# Patient Record
Sex: FEMALE | Race: WHITE
[De-identification: names, ages, dates, MRNs, and addresses within clinical notes are randomized per-mention and may not be internally consistent; named-entity substitution may affect disease eponyms.]

---

## 2019-10-16 ENCOUNTER — HOSPITAL ENCOUNTER (OUTPATIENT)
Dept: HOSPITAL 61 - MAMMO | Age: 69
Discharge: HOME | End: 2019-10-16
Attending: FAMILY MEDICINE
Payer: COMMERCIAL

## 2019-10-16 DIAGNOSIS — N63.21: ICD-10-CM

## 2019-10-16 DIAGNOSIS — Z12.31: Primary | ICD-10-CM

## 2019-10-16 DIAGNOSIS — N63.10: ICD-10-CM

## 2019-10-16 PROCEDURE — 77063 BREAST TOMOSYNTHESIS BI: CPT

## 2019-10-16 PROCEDURE — 77067 SCR MAMMO BI INCL CAD: CPT

## 2019-10-16 NOTE — RAD
DATE: 10/16/2019



EXAM: MAMMO ISAAC SCREENING BILATERAL



HISTORY: Routine screening. Left breast biopsy in 2004.



COMPARISON: 7/10/2015 mammogram



This study was interpreted with the benefit of Computerized Aided Detection

(CAD).





Breast Density: HETERO The breast parenchyma is heterogenously dense, which

could reduce sensitivity of mammography. Breast parenchyma level C.





FINDINGS: Small masses have mostly remained stable.  

Other small new well defined masses are present in the interval but without

aggressive features. Centimeters these are present bilaterally at the lower

axillary level and likely represent lymph nodes. There is a left central 12:00

retroareolar mass which is new since the prior exam likely representing a cyst

considering its low-density. It measures 1.1 cm in diameter and is present 5.4

cm from the nipple. No suspicious calcification or distortion.



IMPRESSION: Indeterminate left central 12:00 breast mass.







BI-RADS CATEGORY: 0 INCOMPLETE: NEEDS ADDITIONAL IMAGING EVALUATION AND/OR

PRIOR MAMMOGRAMS FOR COMPARISON.



RECOMMENDED FOLLOW-UP: ADD ADDITIONAL IMAGING. Ultrasound is recommended for

the left central 12:00 retroareolar mass lesion which is new in the interval.



PQRS compliance statement: Patient information was entered into a reminder

system with a target due date for the next mammogram.



Mammography is a sensitive method for finding small breast cancers, but it

does not detect them all and is not a substitute for careful clinical

examination.  A negative mammogram does not negate a clinically suspicious

finding and should not result in delay in biopsying a clinically suspicious

abnormality.



"Our facility is accredited by the American [employer] Mammography

Program."

## 2019-10-18 ENCOUNTER — HOSPITAL ENCOUNTER (OUTPATIENT)
Dept: HOSPITAL 61 - US | Age: 69
Discharge: HOME | End: 2019-10-18
Attending: FAMILY MEDICINE
Payer: COMMERCIAL

## 2019-10-18 DIAGNOSIS — N63.0: ICD-10-CM

## 2019-10-18 DIAGNOSIS — N60.02: Primary | ICD-10-CM

## 2019-10-18 PROCEDURE — 76641 ULTRASOUND BREAST COMPLETE: CPT

## 2019-10-18 NOTE — RAD
Examination: BREAST LEFT

 

History: Abnormal mammogram

 

Comparison/Correlation: 10/16/2019 screen mammographic exam

 

Findings: At the left breast 12:00 region 2 cm from the nipple, there is a

1.1 cm x 0.7 cm x 0.7 cm tall anechoic structure corresponding to finding 

on mammography. No flow evident involving it.

 

 

Impression:

BI-RADS Category 2-benign. A cyst is present corresponding to the finding 

on mammography. Annual mammographic screening recommended.

 

Electronically signed by: Gustavo Ramirez MD (10/18/2019 12:24 PM) [facility]

## 2020-08-04 ENCOUNTER — HOSPITAL ENCOUNTER (OUTPATIENT)
Dept: HOSPITAL 61 - MAMMO | Age: 70
Discharge: HOME | End: 2020-08-04
Attending: FAMILY MEDICINE
Payer: MEDICARE

## 2020-08-04 DIAGNOSIS — R92.2: Primary | ICD-10-CM

## 2020-08-04 PROCEDURE — 77066 DX MAMMO INCL CAD BI: CPT

## 2020-08-04 NOTE — RAD
DATE: 8/4/2020 10:00 AM



EXAM: MAMMO ISAAC SILAS BILAT



HISTORY:  Diffuse left breast pain. Requested preclinical imaging evaluation

prior to breast specialist consultation.



COMPARISON: Bilateral mammograms of 11/27/2007, 11/5/2009, 7/10/2015 and

10/16/2019



Bilateral CC and MLO views of the breasts were performed. Bilateral breast

tomosynthesis was performed in CC and MLO projections.



This study was interpreted with the benefit of Computerized Aided Detection

(CAD).





FINDINGS:



Breast Density: HETERO  The breast parenchyma Is heterogeneously dense, which

could reduce sensitivity of mammography. Breast parenchyma level C





No suspicious masses, microcalcifications or architectural distortion is

present to suggest malignancy in either breast.





The visualized axillae are unremarkable. 



IMPRESSION: No mammographic evidence of malignancy. 



BI-RADS CATEGORY: 1 NEGATIVE



RECOMMENDED FOLLOW-UP: 12M 12 MONTH FOLLOW-UP Annual screening mammography is

recommended, unless clinically indicated sooner based on symptoms or change in

physical exam.



PQRS compliance statement: Patient information was entered into a reminder

system with a target due date for the next mammogram.



Mammography is a sensitive method for finding small breast cancers, but it

does not detect them all and is not a substitute for careful clinical

examination.  A negative mammogram does not negate a clinically suspicious

finding and should not result in delay in biopsying a clinically suspicious

abnormality.



"Our facility is accredited by the American [employer] of [employer] Mammography

Program."

## 2021-07-14 ENCOUNTER — HOSPITAL ENCOUNTER (EMERGENCY)
Dept: HOSPITAL 61 - ER | Age: 71
Discharge: HOME | End: 2021-07-14
Payer: MEDICARE

## 2021-07-14 VITALS
DIASTOLIC BLOOD PRESSURE: 66 MMHG | SYSTOLIC BLOOD PRESSURE: 148 MMHG | SYSTOLIC BLOOD PRESSURE: 148 MMHG | DIASTOLIC BLOOD PRESSURE: 66 MMHG

## 2021-07-14 VITALS — WEIGHT: 169.32 LBS | BODY MASS INDEX: 28.21 KG/M2 | HEIGHT: 65 IN

## 2021-07-14 DIAGNOSIS — R10.13: ICD-10-CM

## 2021-07-14 DIAGNOSIS — R50.9: ICD-10-CM

## 2021-07-14 DIAGNOSIS — Z98.51: ICD-10-CM

## 2021-07-14 DIAGNOSIS — U07.1: Primary | ICD-10-CM

## 2021-07-14 DIAGNOSIS — R53.1: ICD-10-CM

## 2021-07-14 DIAGNOSIS — Z88.8: ICD-10-CM

## 2021-07-14 DIAGNOSIS — R05: ICD-10-CM

## 2021-07-14 DIAGNOSIS — Z90.49: ICD-10-CM

## 2021-07-14 LAB
ALBUMIN SERPL-MCNC: 3.4 G/DL (ref 3.4–5)
ALBUMIN/GLOB SERPL: 0.9 {RATIO} (ref 1–1.7)
ALP SERPL-CCNC: 57 U/L (ref 46–116)
ALT SERPL-CCNC: 32 U/L (ref 14–59)
ANION GAP SERPL CALC-SCNC: 12 MMOL/L (ref 6–14)
APTT PPP: YELLOW S
AST SERPL-CCNC: 34 U/L (ref 15–37)
BACTERIA #/AREA URNS HPF: 0 /HPF
BASOPHILS # BLD AUTO: 0 X10^3/UL (ref 0–0.2)
BASOPHILS NFR BLD: 0 % (ref 0–3)
BILIRUB SERPL-MCNC: 0.4 MG/DL (ref 0.2–1)
BILIRUB UR QL STRIP: NEGATIVE
BUN SERPL-MCNC: 14 MG/DL (ref 7–20)
BUN/CREAT SERPL: 20 (ref 6–20)
CALCIUM SERPL-MCNC: 8.4 MG/DL (ref 8.5–10.1)
CHLORIDE SERPL-SCNC: 100 MMOL/L (ref 98–107)
CO2 SERPL-SCNC: 24 MMOL/L (ref 21–32)
CREAT SERPL-MCNC: 0.7 MG/DL (ref 0.6–1)
EOSINOPHIL NFR BLD: 0 % (ref 0–3)
EOSINOPHIL NFR BLD: 0 X10^3/UL (ref 0–0.7)
ERYTHROCYTE [DISTWIDTH] IN BLOOD BY AUTOMATED COUNT: 13.3 % (ref 11.5–14.5)
FIBRINOGEN PPP-MCNC: CLEAR MG/DL
GFR SERPLBLD BASED ON 1.73 SQ M-ARVRAT: 82.7 ML/MIN
GLUCOSE SERPL-MCNC: 111 MG/DL (ref 70–99)
HCT VFR BLD CALC: 40.4 % (ref 36–47)
HGB BLD-MCNC: 13.9 G/DL (ref 12–15.5)
LIPASE: 196 U/L (ref 73–393)
LYMPHOCYTES # BLD: 1 X10^3/UL (ref 1–4.8)
LYMPHOCYTES NFR BLD AUTO: 29 % (ref 24–48)
MAGNESIUM SERPL-MCNC: 1.9 MG/DL (ref 1.8–2.4)
MCH RBC QN AUTO: 30 PG (ref 25–35)
MCHC RBC AUTO-ENTMCNC: 34 G/DL (ref 31–37)
MCV RBC AUTO: 89 FL (ref 79–100)
MONO #: 0.3 X10^3/UL (ref 0–1.1)
MONOCYTES NFR BLD: 9 % (ref 0–9)
NEUT #: 2.1 X10^3/UL (ref 1.8–7.7)
NEUTROPHILS NFR BLD AUTO: 62 % (ref 31–73)
NITRITE UR QL STRIP: NEGATIVE
PH UR STRIP: 6 [PH]
PLATELET # BLD AUTO: 167 X10^3/UL (ref 140–400)
POTASSIUM SERPL-SCNC: 4.1 MMOL/L (ref 3.5–5.1)
PROT SERPL-MCNC: 7 G/DL (ref 6.4–8.2)
PROT UR STRIP-MCNC: NEGATIVE MG/DL
RBC # BLD AUTO: 4.56 X10^6/UL (ref 3.5–5.4)
RBC #/AREA URNS HPF: 0 /HPF (ref 0–2)
SODIUM SERPL-SCNC: 136 MMOL/L (ref 136–145)
UROBILINOGEN UR-MCNC: 0.2 MG/DL
WBC # BLD AUTO: 3.5 X10^3/UL (ref 4–11)
WBC #/AREA URNS HPF: (no result) /HPF (ref 0–4)

## 2021-07-14 PROCEDURE — 96375 TX/PRO/DX INJ NEW DRUG ADDON: CPT

## 2021-07-14 PROCEDURE — 81001 URINALYSIS AUTO W/SCOPE: CPT

## 2021-07-14 PROCEDURE — 36415 COLL VENOUS BLD VENIPUNCTURE: CPT

## 2021-07-14 PROCEDURE — 96374 THER/PROPH/DIAG INJ IV PUSH: CPT

## 2021-07-14 PROCEDURE — U0003 INFECTIOUS AGENT DETECTION BY NUCLEIC ACID (DNA OR RNA); SEVERE ACUTE RESPIRATORY SYNDROME CORONAVIRUS 2 (SARS-COV-2) (CORONAVIRUS DISEASE [COVID-19]), AMPLIFIED PROBE TECHNIQUE, MAKING USE OF HIGH THROUGHPUT TECHNOLOGIES AS DESCRIBED BY CMS-2020-01-R: HCPCS

## 2021-07-14 PROCEDURE — 93005 ELECTROCARDIOGRAM TRACING: CPT

## 2021-07-14 PROCEDURE — 74022 RADEX COMPL AQT ABD SERIES: CPT

## 2021-07-14 PROCEDURE — 83690 ASSAY OF LIPASE: CPT

## 2021-07-14 PROCEDURE — 83735 ASSAY OF MAGNESIUM: CPT

## 2021-07-14 PROCEDURE — 80053 COMPREHEN METABOLIC PANEL: CPT

## 2021-07-14 PROCEDURE — 84484 ASSAY OF TROPONIN QUANT: CPT

## 2021-07-14 PROCEDURE — 94640 AIRWAY INHALATION TREATMENT: CPT

## 2021-07-14 PROCEDURE — 99285 EMERGENCY DEPT VISIT HI MDM: CPT

## 2021-07-14 PROCEDURE — 96361 HYDRATE IV INFUSION ADD-ON: CPT

## 2021-07-14 PROCEDURE — 85025 COMPLETE CBC W/AUTO DIFF WBC: CPT

## 2021-07-14 RX ADMIN — BACITRACIN SCH MLS/HR: 5000 INJECTION, POWDER, FOR SOLUTION INTRAMUSCULAR at 12:12

## 2021-07-14 RX ADMIN — METHYLPREDNISOLONE SODIUM SUCCINATE ONE MG: 125 INJECTION, POWDER, FOR SOLUTION INTRAMUSCULAR; INTRAVENOUS at 12:13

## 2021-07-14 RX ADMIN — FAMOTIDINE ONE MG: 10 INJECTION, SOLUTION INTRAVENOUS at 12:12

## 2021-07-14 RX ADMIN — ALBUTEROL SULFATE ONE MG: 108 AEROSOL, METERED RESPIRATORY (INHALATION) at 12:41

## 2021-07-14 RX ADMIN — ONDANSETRON ONE MG: 2 INJECTION INTRAMUSCULAR; INTRAVENOUS at 12:13

## 2021-07-14 NOTE — EKG
[facility]

              [address] [place] [address]

Test Date:    2021               Test Time:    12:20:20

Pat Name:     CAMERON ROSARIO          Department:   

Patient ID:   [id number]           Room:          

Gender:       F                        Technician:   

:          1950               Requested By: JOANNE NICKERSON

Order Number: [id number]           Reading MD:     

                                 Measurements

Intervals                              Axis          

Rate:         83                       P:            -1

AZ:           164                      QRS:          -42

QRSD:         86                       T:            62

QT:           354                                    

QTc:          421                                    

                           Interpretive Statements

SINUS RHYTHM

ABNORMAL LEFT AXIS DEVIATION

LEFT ANTERIOR FASCICULAR BLOCK

ABNORMAL ECG

RI6.02

No previous ECG available for comparison

## 2021-07-14 NOTE — RAD
EXAM: Abdomen series.



HISTORY: Pain.



COMPARISON: None.



FINDINGS: A frontal view of the chest and 2 views of the abdomen and pelvis are obtained. There is no
 infiltrate, pleural effusion or pneumothorax. There are chronic appearing interstitial changes with 
lower lobe predominant atelectasis or scarring. There is a healed left fourth rib fracture. The heart
 is normal in size.



There are air-filled loops of bowel throughout the abdomen. There is no transition point to suggest o
bstruction. There is no free air. There are cholecystectomy clips. There is a healed right inferior p
ubic ramus fracture with adjacent heterotopic ossification. There is degenerative change at the lower
 lumbar levels.



IMPRESSION: 

1. No acute pulmonary finding.

2. Nonspecific bowel gas pattern, without evidence of obstruction.



Electronically signed by: Oneida Pritchard MD (7/14/2021 12:02 PM) [id number]

## 2021-07-14 NOTE — PHYS DOC
Past Medical History


Past Surgical History:  Cholecystectomy, Tubal ligation, Other


Additional Past Surgical Histo:  D&C


Smoking Status:  Never Smoker


Alcohol Use:  None





General Adult


EDM:


Chief Complaint:  MULTIPLE COMPLAINTS





HPI:


HPI:





Patient is a 70  year old female who presents with since  she has had 

fever, weakness, muscle spasms, cough, epigastric pain, nausea, decreased 

appetite.  Patient states that she has not been eating and drinking due to every

time she took a drink she will start to have epigastric pain.  She states that 

she has been having muscle spasms in her chest.  She states is very painful when

she coughs.  She states that she does have slight shortness of air.  She states 

that if she coughs up anything it is because she is coughing so hard that she 

gags it.  She states that up until yesterday she had been running a fever of 

101.  She has not taken any Tylenol or ibuprofen today.  States she called her 

doctor and they told her to come to the emergency room to be evaluated.  She has

not gotten her Covid vaccine or test.  She has history of cholecystectomy, D&C 

and a tubal ligation.  Patient denies vomiting, diarrhea, syncope, dizziness, 

headache, focal weakness, numbness or tingling.  Denies any pain at this time.  

She states she is just uncomfortable and rates that at a 8 out of 10.





Review of Systems:


Review of Systems:


Constitutional:   + fever or chills. []


Eyes:   Denies change in visual acuity. []


HENT:   Denies nasal congestion or sore throat. [] 


Respiratory:   + cough or +shortness of breath. [] 


Cardiovascular:   + chest pain from muscle spasm and cough or denies edema. [] 


GI:   + Epigastric abdominal pain, +nausea, denies vomiting, bloody stools or 

diarrhea. + Decreased appetite [] 


:  Denies dysuria. [] 


Musculoskeletal:   Denies back pain or joint pain. + Generalized muscle 

weakness. + Muscle spasms [] 


Integument:   Denies rash. [] 


Neurologic:   Denies headache, focal weakness or sensory changes. [] 


Endocrine:   Denies polyuria or polydipsia. [] 


Lymphatic:  Denies swollen glands. [] 


Psychiatric:  Denies depression or anxiety. []





Heart Score:


C/O Chest Pain:  No


HEART Score for Chest Pain:  








HEART Score for Chest Pain Response (Comments) Value


 


History Slighlty/Non-Suspicious 0


 


ECG Normal 0


 


Age > 65 2


 


Risk Factors No Risk Factors 0


 


Troponin < Normal Limit 0


 


Total  2








Risk Factors:


Risk Factors:  DM, Current or recent (<one month) smoker, HTN, HLP, family 

history of CAD, obesity.


Risk Scores:


Score 0 - 3:  2.5% MACE over next 6 weeks - Discharge Home


Score 4 - 6:  20.3% MACE over next 6 weeks - Admit for Clinical Observation


Score 7 - 10:  72.7% MACE over next 6 weeks - Early Invasive Strategies





Allergies:


Allergies:





Allergies








Coded Allergies Type Severity Reaction Last Updated Verified


 


  pseudoephedrine Allergy Intermediate  21 Yes











Physical Exam:


PE:





Constitutional: Well developed, well nourished, no acute distress, non-toxic 

appearance. []


HENT: Normocephalic, atraumatic, bilateral external ears normal, oropharynx 

moist, no oral exudates, nose normal. []


Eyes: PERRLA, EOMI, conjunctiva normal, no discharge. [] 


Neck: Normal range of motion, no tenderness, supple, no stridor. [] 


Cardiovascular:Heart rate regular rhythm, no murmur []


Lungs & Thorax:  Bilateral upper breath sounds clear and diminished lower to 

auscultation []


Abdomen: Bowel sounds normal, soft, no tenderness, no masses, no pulsatile 

masses. [] 


Skin: Warm, dry, no erythema, no rash. [] 


Back: No tenderness, no CVA tenderness. [] 


Extremities: No tenderness, no cyanosis, no clubbing, ROM intact, no edema. [] 


Neurologic: Alert and oriented X 3, normal motor function, normal sensory 

function, no focal deficits noted. []


Psychologic: Affect normal, judgement normal, mood normal. []





Current Patient Data:


Vital Signs:





                                   Vital Signs








  Date Time  Temp Pulse Resp B/P (MAP) Pulse Ox O2 Delivery O2 Flow Rate FiO2


 


21 11:20 98.3 89 18 159/68 96 Room Air  





 98.3       











EKG:


EK and read by Dr. Will is sinus rhythm and no STEMI





Radiology/Procedures:


Radiology/Procedures:


[]


Impression:


                            [facility]


                    [address]  [place], [place] [address] [phone]


                                        


                                 IMAGING REPORT





                                     Signed





PATIENT: CAMERON ROSARIO JACCOUNT: [id number]     MRN#: [id number]


: 1950           LOCATION: ER              AGE: 70


SEX: F                    EXAM DT: 21         ACCESSION#: [id number]


STATUS: REG ER            ORD. PHYSICIAN: JOANNE NICKERSON


REASON: epigastric pain, nausea


PROCEDURE: ACUTE ABDOMEN SERIES





EXAM: Abdomen series.





HISTORY: Pain.





COMPARISON: None.





FINDINGS: A frontal view of the chest and 2 views of the abdomen and pelvis are 

obtained. There is no infiltrate, pleural effusion or pneumothorax. There are 

chronic appearing interstitial changes with lower lobe predominant atelectasis 

or scarring. There is a healed left fourth rib fracture. The heart is normal in 

size.





There are air-filled loops of bowel throughout the abdomen. There is no transiti

on point to suggest obstruction. There is no free air. There are cholecystectomy

 clips. There is a healed right inferior pubic ramus fracture with adjacent 

heterotopic ossification. There is degenerative change at the lower lumbar 

levels.





IMPRESSION: 


1. No acute pulmonary finding.


2. Nonspecific bowel gas pattern, without evidence of obstruction.





Electronically signed by: Oneida Spangler MD (2021 12:02 PM) [id number]














DICTATED and SIGNED BY:     ONEIDA SPANGLER MD


DATE:     21 [id number] 0





Course & Med Decision Making:


Course & Med Decision Making


Pertinent Labs and Imaging studies reviewed. (See chart for details)





COVID-19 CRITERIA:    The patient was evaluated during the global COVID-19 

pandemic, and that diagnosis was suspected/considered upon their initial 

presentation.  Their evaluation, treatment and testing was consistent with 

current guidelines for patients who present with complaints or symptoms that may

 be related to COVID-19.





See HPI.  Alert and oriented x4.  Ambulatory with a steady gait.  Speaks in full

 clear sentences.  Abdomen soft and nontender.  Lungs are clear in upper lobes 

and diminished in lower lobes.  Afebrile here today.





Blood work unremarkable.  Chest x-ray shows no acute findings.  She is tested 

for Covid.  I will send her home with nausea medication, Medrol Dosepak and 

albuterol inhaler.





[]





Dragon Disclaimer:


Dragon Disclaimer:


This electronic medical record was generated, in whole or in part, using a voice

 recognition dictation system.





COVID-19 Patient Risks:


Age 65 or older:  Yes


Sign of co-morbidity:  No


Exp to person + for COVID:  No


Exp to PUI:  No


Lower respiratory symptoms:  Yes


Fever:  Yes


Other:  Yes (nausea)





PPE Use:


Full PPE with N95 mask or PAPR:  Yes





Departure


Departure


Impression:  


   Primary Impression:  


   Cough


   Additional Impressions:  


   Weakness


   Fever


   Qualified Codes:  R50.9 - Fever, unspecified


   Epigastric discomfort


   Person under investigation for COVID-19


Disposition:   HOME / SELF CARE / HOMELESS


Condition:  STABLE


Referrals:  


LUIS VARGHESE (PCP)


Patient Instructions:  Cough, Adult, Fever, Adult





Additional Instructions:  


Follow-up with your primary care provider.  Drink plenty of fluids.  Take 

medication as prescribed and with food.  If anything worsens you can return to 

the emergency room.


Scripts


Albuterol Sulfate (PROAIR HFA INHALER) 8.5 Gm Hfa.aer.ad


1 PUFF INH PRN Q6HRS PRN for SHORTNESS OF BREATH, #1 EACH 0 Refills


   Prov: JOANNE NICKERSON APRN         21 


Methylprednisolone (MEDROL) 4 Mg Tab.ds.pk


1 PKG PO UD, #1 PKG


   Prov: JOANNE NICKERSON APRN         21 


Benzonatate (TESSALON PERLE) 100 Mg Capsule


1 CAP PO TID, #30 CAP


   Prov: JOANNE NICKERSON 21











JOANNE NICKERSON APRN            2021 11:46

## 2021-09-02 ENCOUNTER — HOSPITAL ENCOUNTER (OUTPATIENT)
Dept: HOSPITAL 61 - MAMMO | Age: 71
End: 2021-09-02
Attending: OBSTETRICS & GYNECOLOGY
Payer: MEDICARE

## 2021-09-02 DIAGNOSIS — N63.21: Primary | ICD-10-CM

## 2021-09-02 PROCEDURE — 76641 ULTRASOUND BREAST COMPLETE: CPT

## 2021-09-02 PROCEDURE — 77066 DX MAMMO INCL CAD BI: CPT

## 2021-09-02 NOTE — RAD
CLINICAL INDICATION: CAMERON ROSARIO, who is 70 years of age, presents for imaging evaluation of a 
region of pain within the left breast



COMPARISON: Prior mammographic imaging dating back to  



TECHNIQUE: Full field craniocaudal and mediolateral oblique images of both breasts were obtained usin
g digital technique with tomosynthesis and also analyzed with computer-aided detection software.



BREAST COMPOSITION: Category C: The breast tissue is heterogeneously dense, which could obscure detec
tion of small masses.



MAMMOGRAM FINDINGS:



There are no suspicious masses, microcalcifications, or architectural distortion to suggest malignanc
y in the either breast.



The visualized axilla appears unremarkable.



Given the region of breast pain sonographic imaging was obtained.



ULTRASOUND FINDINGS: 

Targeted ultrasound of the patient detected area of concern was performed. 



12:00 position, 4 cm from the nipple: A hypoechoic irregular mass and non circumscribed margins is pr
esent measuring 0.5 x 0.5 x 0.5 cm



IMPRESSION:

1. No mammographic evidence of malignancy in either breast.  



RECOMMENDATION:

Biopsy recommended. The results were discussed with the patient at the end of the procedure.



BIRADS 4: SUSPICIOUS 





This study was interpreted with the benefit of Computerized Aided Detection (CAD).



?Your patient's mammogram demonstrates that she has dense breast tissue (breast density category C or
 D), which could hide abnormalities, and if she has other risk factors for breast cancer that have be
en identified, she might benefit from supplemental screening tests that may be suggested by you as he
r ordering physician. Dense breast tissue, in and of itself, is a relatively common condition. Theref
ore, this information is not provided to cause undue concern, but rather to raise your awareness and 
to promote discussion with your patient regarding the presence of other risk factors, in addition to 
dense breast tissue. Your patient's mammography results will be sent to her.



Patient information is entered into the reminder system with a target due date for the next screening
 mammogram.



Mammography is the most sensitive method for finding small breast cancers, but it does not detect the
m all and is not a substitute for careful clinical examination. A negative mammogram does not negate 
a clinically suspicious finding and should not result in delay in biopsying a clinically suspicious a
bnormality.



 "Our facility is accredited by the American College of [employer] Mammography Program."



Electronically signed by: Rafael Valverde MD (9/2/2021 5:02 PM) [facility]

## 2021-09-02 NOTE — RAD
CLINICAL INDICATION: CAMERON ROSARIO, who is 70 years of age, presents for imaging evaluation of a 
region of pain within the left breast



COMPARISON: Prior mammographic imaging dating back to  



TECHNIQUE: Full field craniocaudal and mediolateral oblique images of both breasts were obtained usin
g digital technique with tomosynthesis and also analyzed with computer-aided detection software.



BREAST COMPOSITION: Category C: The breast tissue is heterogeneously dense, which could obscure detec
tion of small masses.



MAMMOGRAM FINDINGS:



There are no suspicious masses, microcalcifications, or architectural distortion to suggest malignanc
y in the either breast.



The visualized axilla appears unremarkable.



Given the region of breast pain sonographic imaging was obtained.



ULTRASOUND FINDINGS: 

Targeted ultrasound of the patient detected area of concern was performed. 



12:00 position, 4 cm from the nipple: A hypoechoic irregular mass and non circumscribed margins is pr
esent measuring 0.5 x 0.5 x 0.5 cm



IMPRESSION:

1. No mammographic evidence of malignancy in either breast.  



RECOMMENDATION:

Biopsy recommended. The results were discussed with the patient at the end of the procedure.



BIRADS 4: SUSPICIOUS 





This study was interpreted with the benefit of Computerized Aided Detection (CAD).



?Your patient's mammogram demonstrates that she has dense breast tissue (breast density category C or
 D), which could hide abnormalities, and if she has other risk factors for breast cancer that have be
en identified, she might benefit from supplemental screening tests that may be suggested by you as he
r ordering physician. Dense breast tissue, in and of itself, is a relatively common condition. Theref
ore, this information is not provided to cause undue concern, but rather to raise your awareness and 
to promote discussion with your patient regarding the presence of other risk factors, in addition to 
dense breast tissue. Your patient's mammography results will be sent to her.



Patient information is entered into the reminder system with a target due date for the next screening
 mammogram.



Mammography is the most sensitive method for finding small breast cancers, but it does not detect the
m all and is not a substitute for careful clinical examination. A negative mammogram does not negate 
a clinically suspicious finding and should not result in delay in biopsying a clinically suspicious a
bnormality.



 "Our facility is accredited by the American [employer] Mammography Program."



Electronically signed by: Rafael Valverde MD (9/2/2021 5:02 PM) [facility]